# Patient Record
Sex: MALE | Race: WHITE
[De-identification: names, ages, dates, MRNs, and addresses within clinical notes are randomized per-mention and may not be internally consistent; named-entity substitution may affect disease eponyms.]

---

## 2017-05-04 ENCOUNTER — HOSPITAL ENCOUNTER (EMERGENCY)
Dept: HOSPITAL 45 - C.EDB | Age: 52
LOS: 1 days | Discharge: HOME | End: 2017-05-05
Payer: COMMERCIAL

## 2017-05-04 VITALS
BODY MASS INDEX: 32.26 KG/M2 | HEIGHT: 70 IN | BODY MASS INDEX: 32.26 KG/M2 | HEIGHT: 70 IN | WEIGHT: 225.31 LBS | WEIGHT: 225.31 LBS

## 2017-05-04 DIAGNOSIS — Z98.890: ICD-10-CM

## 2017-05-04 DIAGNOSIS — M62.838: Primary | ICD-10-CM

## 2017-05-04 DIAGNOSIS — C7A.026: ICD-10-CM
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
## 2017-05-05 VITALS
SYSTOLIC BLOOD PRESSURE: 145 MMHG | TEMPERATURE: 98.42 F | HEART RATE: 78 BPM | DIASTOLIC BLOOD PRESSURE: 89 MMHG | OXYGEN SATURATION: 98 %

## 2017-05-05 LAB
ANION GAP SERPL CALC-SCNC: 7 MMOL/L (ref 3–11)
APPEARANCE UR: CLEAR
BASOPHILS # BLD: 0.02 K/UL (ref 0–0.2)
BASOPHILS NFR BLD: 0.2 %
BILIRUB UR-MCNC: (no result) MG/DL
BUN SERPL-MCNC: 15 MG/DL (ref 7–18)
BUN/CREAT SERPL: 18.8 (ref 10–20)
CALCIUM SERPL-MCNC: 8.4 MG/DL (ref 8.5–10.1)
CHLORIDE SERPL-SCNC: 108 MMOL/L (ref 98–107)
CO2 SERPL-SCNC: 29 MMOL/L (ref 21–32)
COLOR UR: YELLOW
COMPLETE: YES
CREAT CL PREDICTED SERPL C-G-VRATE: 127.9 ML/MIN
CREAT SERPL-MCNC: 0.8 MG/DL (ref 0.6–1.4)
EOSINOPHIL NFR BLD AUTO: 209 K/UL (ref 130–400)
GLUCOSE SERPL-MCNC: 106 MG/DL (ref 70–99)
HCT VFR BLD CALC: 41.8 % (ref 42–52)
IG%: 0.3 %
IMM GRANULOCYTES NFR BLD AUTO: 12.7 %
LYMPHOCYTES # BLD: 1.52 K/UL (ref 1.2–3.4)
MANUAL MICROSCOPIC REQUIRED?: NO
MCH RBC QN AUTO: 29.9 PG (ref 25–34)
MCHC RBC AUTO-ENTMCNC: 35.6 G/DL (ref 32–36)
MCV RBC AUTO: 83.9 FL (ref 80–100)
MONOCYTES NFR BLD: 8.7 %
NEUTROPHILS # BLD AUTO: 0.9 %
NEUTROPHILS NFR BLD AUTO: 77.2 %
NITRITE UR QL STRIP: (no result)
PH UR STRIP: 5.5 [PH] (ref 4.5–7.5)
PMV BLD AUTO: 10 FL (ref 7.4–10.4)
POTASSIUM SERPL-SCNC: 3.5 MMOL/L (ref 3.5–5.1)
RBC # BLD AUTO: 4.98 M/UL (ref 4.7–6.1)
REVIEW REQ?: NO
SODIUM SERPL-SCNC: 144 MMOL/L (ref 136–145)
SP GR UR STRIP: 1.02 (ref 1–1.03)
URINE BILL WITH OR WITHOUT MIC: (no result)
UROBILINOGEN UR-MCNC: (no result) MG/DL
WBC # BLD AUTO: 11.97 K/UL (ref 4.8–10.8)
ZZUR CULT IF INDIC CLEAN CATCH: NO

## 2017-05-05 NOTE — DIAGNOSTIC IMAGING REPORT
TWO VIEW CHEST



CLINICAL HISTORY: Atypical chest pain.



FINDINGS: PA and lateral chest radiographs are obtained. No prior studies are

available for comparison at the time of dictation.  The cardiomediastinal

silhouette is unremarkable.  The lungs and pleural spaces are clear. There is no

pneumothorax. The bony thorax appears intact.



IMPRESSION: No active disease in the chest.







Electronically signed by:  Kelvin Roman M.D.

5/5/2017 7:29 AM



Dictated Date/Time:  5/5/2017 7:29 AM

## 2018-04-08 ENCOUNTER — HOSPITAL ENCOUNTER (EMERGENCY)
Dept: HOSPITAL 45 - C.EDB | Age: 53
Discharge: HOME | End: 2018-04-08
Payer: COMMERCIAL

## 2018-04-08 VITALS — DIASTOLIC BLOOD PRESSURE: 90 MMHG | OXYGEN SATURATION: 97 % | HEART RATE: 56 BPM | SYSTOLIC BLOOD PRESSURE: 134 MMHG

## 2018-04-08 VITALS
WEIGHT: 226.19 LBS | BODY MASS INDEX: 32.38 KG/M2 | HEIGHT: 70 IN | HEIGHT: 70 IN | WEIGHT: 226.19 LBS | BODY MASS INDEX: 32.38 KG/M2

## 2018-04-08 VITALS — TEMPERATURE: 97.34 F

## 2018-04-08 DIAGNOSIS — Z20.3: Primary | ICD-10-CM

## 2018-04-08 DIAGNOSIS — Z23: ICD-10-CM
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
## 2018-04-11 ENCOUNTER — HOSPITAL ENCOUNTER (EMERGENCY)
Dept: HOSPITAL 45 - C.EDB | Age: 53
Discharge: HOME | End: 2018-04-11
Payer: COMMERCIAL

## 2018-04-11 VITALS
TEMPERATURE: 98.06 F | SYSTOLIC BLOOD PRESSURE: 117 MMHG | OXYGEN SATURATION: 95 % | HEART RATE: 79 BPM | DIASTOLIC BLOOD PRESSURE: 80 MMHG

## 2018-04-11 VITALS
HEIGHT: 70 IN | HEIGHT: 70 IN | WEIGHT: 227.3 LBS | BODY MASS INDEX: 32.54 KG/M2 | WEIGHT: 227.3 LBS | BODY MASS INDEX: 32.54 KG/M2

## 2018-04-11 DIAGNOSIS — Z23: Primary | ICD-10-CM

## 2018-04-11 DIAGNOSIS — Z20.3: ICD-10-CM
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
## 2018-04-15 ENCOUNTER — HOSPITAL ENCOUNTER (EMERGENCY)
Dept: HOSPITAL 45 - C.EDB | Age: 53
Discharge: HOME | End: 2018-04-15
Payer: COMMERCIAL

## 2018-04-15 VITALS
HEIGHT: 70 IN | HEIGHT: 70 IN | BODY MASS INDEX: 32.19 KG/M2 | WEIGHT: 224.87 LBS | WEIGHT: 224.87 LBS | BODY MASS INDEX: 32.19 KG/M2

## 2018-04-15 VITALS
TEMPERATURE: 98.24 F | DIASTOLIC BLOOD PRESSURE: 79 MMHG | OXYGEN SATURATION: 96 % | HEART RATE: 66 BPM | SYSTOLIC BLOOD PRESSURE: 123 MMHG

## 2018-04-15 DIAGNOSIS — Z20.3: ICD-10-CM

## 2018-04-15 DIAGNOSIS — Z23: Primary | ICD-10-CM

## 2018-04-15 NOTE — EMERGENCY ROOM VISIT NOTE
History


First contact with patient:  11:11


Chief Complaint:  RABIES VACCINE REPEAT VISIT


Stated Complaint:  3RD RABIES VACCINE





History of Present Illness


The patient is a 53 year old male who presents to the Emergency Room for a 

third Imovax immunization.  The patient denies any adverse reactions to the 

injection.





Review of Systems


Noncontributory and unchanged from previous visits





Past Medical/Surgical History


Surgical Problems:


(1) Rectal tumor








Family History





No significant family history





Social History


Smoking Status:  Never Smoker


Alcohol Use:  occasionally


Drug Use:  none


Marital Status:  


Housing Status:  lives with significant other


Occupation Status:  employed





Current/Historical Medications


Scheduled


Epinephrine (Epipen), 0.3 MG IM UD


Hydrocortisone 1% (Hydrocortisone 1%), 1 APPLN TOP UD


Lamotrigine (Lamictal), 200 MG PO AMPM





Scheduled PRN


Acetaminophen Tab (Tylenol), 650 MG PO Q6H PRN for Pain


Ibuprofen Tab (Advil), 400-600 MG PO Q6 PRN





Physical Exam


Vital Signs











  Date Time  Temp Pulse Resp B/P (MAP) Pulse Ox O2 Delivery O2 Flow Rate FiO2


 


4/15/18 11:00 36.8 66 18 123/79 96 Room Air  











Physical Exam


CONSTITUTIONAL:  Healthy and well nourished.  


HEENT: No scleral icterus or conjunctival injection.


INTEGUMENTARY:  No rash or other significant dermatologic conditions noted.


NEUROLOGIC:  No focal neurologic deficits noted.





Medical Decision & Procedures


Medications Administered











 Medications


  (Trade)  Dose


 Ordered  Sig/Tan


 Route  Start Time


 Stop Time Status Last Admin


Dose Admin


 


 Rabies Vaccine


 Human Diploid Cell


  (Imovax Rabies)  2.5


 interunit  ONCE ONCE


 IM.  4/15/18 11:30


 4/15/18 11:31 DC 4/15/18 11:34


2.5 INTERUNIT











ED Course


Patient history and physical exam were performed.  Nurse's notes were reviewed.

  Vital signs were reviewed and were normal.  The patient was administered 

Imovax IM without adverse reaction.  The patient will return in 1 week for a 

final Imovax injection, returning sooner with any adverse reaction to today's 

injection.  The patient denied any pain at the time of discharge.





Medical Decision








Blood Pressure Screening


Patient's blood pressure:  Normal blood pressure





Impression





 Primary Impression:  


 Rabies, need for prophylactic vaccination against





Departure Information


Dispostion


Home / Self-Care





Condition


GOOD





Forms


HOME CARE DOCUMENTATION FORM,                                                 

               IMPORTANT VISIT INFORMATION





Patient Instructions


Critical access hospital





Additional Instructions





Return next Sunday for your final Imovax injection

## 2018-04-22 ENCOUNTER — HOSPITAL ENCOUNTER (EMERGENCY)
Dept: HOSPITAL 45 - C.EDB | Age: 53
Discharge: HOME | End: 2018-04-22
Payer: COMMERCIAL

## 2018-04-22 VITALS
SYSTOLIC BLOOD PRESSURE: 128 MMHG | DIASTOLIC BLOOD PRESSURE: 78 MMHG | HEART RATE: 70 BPM | TEMPERATURE: 98.42 F | OXYGEN SATURATION: 97 %

## 2018-04-22 VITALS
WEIGHT: 225.97 LBS | HEIGHT: 70 IN | BODY MASS INDEX: 32.35 KG/M2 | HEIGHT: 70 IN | WEIGHT: 225.97 LBS | BODY MASS INDEX: 32.35 KG/M2

## 2018-04-22 DIAGNOSIS — Z20.3: ICD-10-CM

## 2018-04-22 DIAGNOSIS — Z91.013: ICD-10-CM

## 2018-04-22 DIAGNOSIS — Z91.048: ICD-10-CM

## 2018-04-22 DIAGNOSIS — Z88.7: ICD-10-CM

## 2018-04-22 DIAGNOSIS — Z79.899: ICD-10-CM

## 2018-04-22 DIAGNOSIS — Z23: Primary | ICD-10-CM
